# Patient Record
Sex: FEMALE | URBAN - METROPOLITAN AREA
[De-identification: names, ages, dates, MRNs, and addresses within clinical notes are randomized per-mention and may not be internally consistent; named-entity substitution may affect disease eponyms.]

---

## 2020-10-01 ENCOUNTER — APPOINTMENT (RX ONLY)
Dept: URBAN - METROPOLITAN AREA CLINIC 173 | Facility: CLINIC | Age: 75
Setting detail: DERMATOLOGY
End: 2020-10-01

## 2020-10-01 DIAGNOSIS — Z41.9 ENCOUNTER FOR PROCEDURE FOR PURPOSES OTHER THAN REMEDYING HEALTH STATE, UNSPECIFIED: ICD-10-CM

## 2020-10-01 PROCEDURE — ? PATIENT SPECIFIC COUNSELING

## 2020-10-01 PROCEDURE — ? TREATMENT REGIMEN

## 2020-10-01 PROCEDURE — ? COSMETIC CONSULTATION: BOTULINUM TOXIN

## 2020-10-01 PROCEDURE — ? COSMETIC CONSULTATION: LASER RESURFACING

## 2020-10-01 PROCEDURE — ? COSMETIC CONSULTATION: FILLERS

## 2020-10-01 PROCEDURE — ? PRESCRIPTION

## 2020-10-01 PROCEDURE — ? BOTOX

## 2020-10-01 RX ORDER — LIDOCAINE AND PRILOCAINE 25; 25 MG/G; MG/G
CREAM TOPICAL
Qty: 1 | Refills: 3 | Status: ERX | COMMUNITY
Start: 2020-10-01

## 2020-10-01 RX ADMIN — LIDOCAINE AND PRILOCAINE: 25; 25 CREAM TOPICAL at 00:00

## 2020-10-01 NOTE — PROCEDURE: BOTOX
Show Glabellar Units: Yes
Expiration Date (Month Year): 4/23
L Brow Units: 0
Price (Use Numbers Only, No Special Characters Or $): 624
Detail Level: Zone
Show Ucl Units: No
Lot #: [id number]
Consent: Written consent obtained. Risks include but not limited to lid/brow ptosis, bruising, swelling, diplopia, temporary effect, incomplete chemical denervation.
Dilution (U/0.1 Cc): 0.2
Post-Care Instructions: Patient instructed to not lie down for 4 hours and limit physical activity for 24 hours.
Additional Area 1 Location: face
Additional Area 1 Units: 59

## 2020-10-09 ENCOUNTER — APPOINTMENT (RX ONLY)
Dept: URBAN - METROPOLITAN AREA CLINIC 173 | Facility: CLINIC | Age: 75
Setting detail: DERMATOLOGY
End: 2020-10-09

## 2020-10-09 DIAGNOSIS — Z41.9 ENCOUNTER FOR PROCEDURE FOR PURPOSES OTHER THAN REMEDYING HEALTH STATE, UNSPECIFIED: ICD-10-CM

## 2020-10-09 PROCEDURE — ? FILLERS

## 2020-10-09 NOTE — PROCEDURE: FILLERS
Brows Filler  Volume In Cc: 0
Lot #: [id number]
Additional Area 1 Location: Face
Expiration Date (Month Year): 7/4/21
Include Cannula Information In Note?: Yes
Anesthesia Type: 1% lidocaine with epinephrine 1:100,000 buffered with 8.4% sodium bicarbonate (1:9 ratio)
Consent: Written consent obtained. Risks include but not limited to bruising, beading, irregular texture, ulceration, infection, allergic reaction, scar formation, incomplete augmentation, temporary nature, procedural pain.
Anesthesia Volume In Cc: 0.2
Lot #: [id number]
Post-Care Instructions: Patient instructed to apply ice to reduce swelling.
Expiration Date (Month Year): 3/31/2022
Include Cannula Size?: 25G
Include Cannula Information In Note?: No
Topical Anesthesia?: 2.5% lidocaine, 2.5% prilocaine
Include Cannula Length?: 1.5 inch
Additional Area 1 Volume In Cc: 3
Lot #: [id number]
Expiration Date (Month Year): 5/15/16
Detail Level: Zone
Filler: Juvederm Ultra XC
Price (Use Numbers Only, No Special Characters Or $): 0126
Map Statment: See Attach Map for Complete Details

## 2021-01-07 ENCOUNTER — APPOINTMENT (RX ONLY)
Dept: URBAN - METROPOLITAN AREA CLINIC 173 | Facility: CLINIC | Age: 76
Setting detail: DERMATOLOGY
End: 2021-01-07

## 2021-01-07 ENCOUNTER — RX ONLY (OUTPATIENT)
Age: 76
Setting detail: RX ONLY
End: 2021-01-07

## 2021-01-07 VITALS — DIASTOLIC BLOOD PRESSURE: 70 MMHG | HEART RATE: 77 BPM | SYSTOLIC BLOOD PRESSURE: 105 MMHG

## 2021-01-07 DIAGNOSIS — Z41.9 ENCOUNTER FOR PROCEDURE FOR PURPOSES OTHER THAN REMEDYING HEALTH STATE, UNSPECIFIED: ICD-10-CM

## 2021-01-07 PROCEDURE — ? SECRET RF

## 2021-01-07 RX ORDER — PHARMACY COMPOUNDING ACCESSORY
EACH MISCELLANEOUS
Qty: 1 | Refills: 2 | Status: ERX | COMMUNITY
Start: 2021-01-07

## 2021-01-07 RX ORDER — VALACYCLOVIR HYDROCHLORIDE 500 MG/1
TABLET, FILM COATED ORAL
Qty: 30 | Refills: 2 | Status: ERX | COMMUNITY
Start: 2021-01-07

## 2021-01-07 NOTE — PROCEDURE: SECRET RF
Depth In Microns: 0.5
Passes: 3
Tip: 64-Insulated
Tip: 24-Insulated
Passes: 0
Rf Duration (Include Units If Applicable): 300ms
Topical Anesthesia?: 23% lidocaine, 7% tetracaine
Consent: Written consent obtained, risks reviewed including but not limited to darker or lighter pigmentary change, and/or incomplete improvement.
Depth In Microns: 1
Passes: 2
Intensity (Include Units If Applicable): 6
Rf Duration (Include Units If Applicable): 300
Length Of Topical Anesthesia Application (Optional): 60 minutes
Rf Duration (Include Units If Applicable): 200
Intensity (Include Units If Applicable): 5
Rf Duration (Include Units If Applicable): 200ms
Rf Duration (Include Units If Applicable): 300
Detail Level: Zone
Treatment Area: arms
Post-Care Instructions: I reviewed with the patient in detail post-care instructions. Alastin skin nectar applied post, apply at home x 1-2 days.
Intensity (Include Units If Applicable): 7
Depth In Microns: 1.5
Render Post-Care In The Note: Yes
Indication: Skin Laxity
Use Distraction Techniques In Note: No
Rf Duration (Include Units If Applicable): 250

## 2021-02-04 ENCOUNTER — APPOINTMENT (RX ONLY)
Dept: URBAN - METROPOLITAN AREA CLINIC 173 | Facility: CLINIC | Age: 76
Setting detail: DERMATOLOGY
End: 2021-02-04

## 2021-02-04 ENCOUNTER — RX ONLY (OUTPATIENT)
Age: 76
Setting detail: RX ONLY
End: 2021-02-04

## 2021-02-04 VITALS — HEART RATE: 83 BPM | DIASTOLIC BLOOD PRESSURE: 66 MMHG | SYSTOLIC BLOOD PRESSURE: 111 MMHG

## 2021-02-04 DIAGNOSIS — Z41.9 ENCOUNTER FOR PROCEDURE FOR PURPOSES OTHER THAN REMEDYING HEALTH STATE, UNSPECIFIED: ICD-10-CM

## 2021-02-04 PROCEDURE — ? SECRET RF

## 2021-02-04 RX ORDER — LIDOCAINE AND PRILOCAINE 25; 25 MG/G; MG/G
CREAM TOPICAL
Qty: 1 | Refills: 3 | Status: ERX

## 2021-02-04 NOTE — PROCEDURE: SECRET RF
Depth In Microns: 0.5
Tip: 24-Insulated
Passes: 2
Passes: 0
Indication: Skin Laxity
Detail Level: Zone
Topical Anesthesia?: 23% lidocaine, 7% tetracaine
Intensity (Include Units If Applicable): 5
Tip: 64-Insulated
Rf Duration (Include Units If Applicable): 300
Length Of Topical Anesthesia Application (Optional): 60 minutes
Post-Care Instructions: I reviewed with the patient in detail post-care instructions. Alastin skin nectar applied post, apply at home x 1-2 days.
Intensity (Include Units If Applicable): 6
Depth In Microns: 1.5
Rf Duration (Include Units If Applicable): 200ms
Depth In Microns: 1
Consent: Written consent obtained, risks reviewed including but not limited to darker or lighter pigmentary change, and/or incomplete improvement.
Intensity (Include Units If Applicable): 7
Rf Duration (Include Units If Applicable): 300ms
Rf Duration (Include Units If Applicable): 300
Rf Duration (Include Units If Applicable): 250
Passes: 3
Render Post-Care In The Note: Yes
Rf Duration (Include Units If Applicable): 200
Treatment Area: arms
Use Distraction Techniques In Note: No

## 2021-03-04 ENCOUNTER — APPOINTMENT (RX ONLY)
Dept: URBAN - METROPOLITAN AREA CLINIC 173 | Facility: CLINIC | Age: 76
Setting detail: DERMATOLOGY
End: 2021-03-04

## 2021-03-04 DIAGNOSIS — Z41.9 ENCOUNTER FOR PROCEDURE FOR PURPOSES OTHER THAN REMEDYING HEALTH STATE, UNSPECIFIED: ICD-10-CM

## 2021-03-04 PROCEDURE — ? BOTOX

## 2021-03-04 PROCEDURE — ? FILLERS

## 2021-03-04 NOTE — PROCEDURE: FILLERS
Tear Troughs Filler  Volume In Cc: 0
Include Cannula Information In Note?: No
Anesthesia Type: 1% lidocaine with epinephrine 1:100,000 buffered with 8.4% sodium bicarbonate (1:9 ratio)
Lot #: [id number]
Include Cannula Size?: 25G
Include Cannula Information In Note?: Yes
Expiration Date (Month Year): 6/12/23
Anesthesia Volume In Cc: 0.2
Include Cannula Length?: 1.5 inch
Additional Area 1 Volume In Cc: 0.5
Lot #: [id number]
Topical Anesthesia?: 2.5% lidocaine, 2.5% prilocaine
Additional Area 1 Location: Face
Expiration Date (Month Year): 5/15/16
Additional Area 1 Volume In Cc: 5
Detail Level: Zone
Filler: Juvederm Volbella XC
Consent: Written consent obtained. Risks include but not limited to bruising, beading, irregular texture, ulceration, infection, allergic reaction, scar formation, incomplete augmentation, temporary nature, procedural pain.
Price (Use Numbers Only, No Special Characters Or $): 4000
Post-Care Instructions: Patient instructed to apply ice to reduce swelling.
Lot #: [id number]
Filler: RHA 4
Map Statment: See Attach Map for Complete Details
Expiration Date (Month Year): 7/25/22

## 2021-03-04 NOTE — PROCEDURE: BOTOX
Anterior Platysmal Bands Units: 0
Show Additional Area 5: Yes
Show Mentalis Units: No
Detail Level: Zone
Expiration Date (Month Year): 10/23
Lot #: [id number]
Price (Use Numbers Only, No Special Characters Or $): 064
Dilution (U/0.1 Cc): 0.2
Additional Area 1 Location: face
Consent: Written consent obtained. Risks include but not limited to lid/brow ptosis, bruising, swelling, diplopia, temporary effect, incomplete chemical denervation.
Additional Area 1 Units: 59
Post-Care Instructions: Patient instructed to not lie down for 4 hours and limit physical activity for 24 hours.

## 2021-04-06 ENCOUNTER — RX ONLY (OUTPATIENT)
Age: 76
Setting detail: RX ONLY
End: 2021-04-06

## 2021-04-06 ENCOUNTER — APPOINTMENT (RX ONLY)
Dept: URBAN - METROPOLITAN AREA CLINIC 173 | Facility: CLINIC | Age: 76
Setting detail: DERMATOLOGY
End: 2021-04-06

## 2021-04-06 VITALS — DIASTOLIC BLOOD PRESSURE: 66 MMHG | SYSTOLIC BLOOD PRESSURE: 101 MMHG | HEART RATE: 91 BPM

## 2021-04-06 DIAGNOSIS — Z41.9 ENCOUNTER FOR PROCEDURE FOR PURPOSES OTHER THAN REMEDYING HEALTH STATE, UNSPECIFIED: ICD-10-CM

## 2021-04-06 PROCEDURE — ? SECRET RF

## 2021-04-06 RX ORDER — PHARMACY COMPOUNDING ACCESSORY
EACH MISCELLANEOUS
Qty: 1 | Refills: 2 | Status: ERX

## 2021-04-06 NOTE — PROCEDURE: SECRET RF
Depth In Microns: 0.5
Passes: 0
Tip: 24-Insulated
Passes: 2
Intensity (Include Units If Applicable): 6
Use Distraction Techniques In Note: No
Rf Duration (Include Units If Applicable): 300
Passes: 3
Topical Anesthesia?: 23% lidocaine, 7% tetracaine
Rf Duration (Include Units If Applicable): 300ms
Tip: 64-Insulated
Rf Duration (Include Units If Applicable): 300
Render Post-Care In The Note: Yes
Length Of Topical Anesthesia Application (Optional): 60 minutes
Rf Duration (Include Units If Applicable): 200ms
Intensity (Include Units If Applicable): 7
Indication: Skin Laxity
Depth In Microns: 1.5
Rf Duration (Include Units If Applicable): 250
Consent: Written consent obtained, risks reviewed including but not limited to darker or lighter pigmentary change, and/or incomplete improvement.
Intensity (Include Units If Applicable): 5
Depth In Microns: 1
Post-Care Instructions: I reviewed with the patient in detail post-care instructions. Alastin skin nectar applied post, apply at home x 1-2 days.
Detail Level: Zone
Treatment Area: arms
Rf Duration (Include Units If Applicable): 200